# Patient Record
Sex: FEMALE | Race: WHITE | HISPANIC OR LATINO | Employment: UNEMPLOYED | URBAN - METROPOLITAN AREA
[De-identification: names, ages, dates, MRNs, and addresses within clinical notes are randomized per-mention and may not be internally consistent; named-entity substitution may affect disease eponyms.]

---

## 2021-09-30 ENCOUNTER — OFFICE VISIT (OUTPATIENT)
Dept: INTERNAL MEDICINE CLINIC | Facility: CLINIC | Age: 51
End: 2021-09-30

## 2021-09-30 VITALS
DIASTOLIC BLOOD PRESSURE: 71 MMHG | BODY MASS INDEX: 26.39 KG/M2 | TEMPERATURE: 98.3 F | SYSTOLIC BLOOD PRESSURE: 124 MMHG | HEIGHT: 60 IN | HEART RATE: 65 BPM | OXYGEN SATURATION: 99 % | WEIGHT: 134.4 LBS

## 2021-09-30 DIAGNOSIS — L71.9 ROSACEA: ICD-10-CM

## 2021-09-30 DIAGNOSIS — G56.03 CARPAL TUNNEL SYNDROME, BILATERAL UPPER LIMBS: ICD-10-CM

## 2021-09-30 DIAGNOSIS — M25.50 ARTHRALGIA, UNSPECIFIED JOINT: ICD-10-CM

## 2021-09-30 DIAGNOSIS — Z23 NEED FOR INFLUENZA VACCINATION: ICD-10-CM

## 2021-09-30 DIAGNOSIS — L65.9 HAIR THINNING: ICD-10-CM

## 2021-09-30 DIAGNOSIS — Z23 NEED FOR TDAP VACCINATION: ICD-10-CM

## 2021-09-30 DIAGNOSIS — N92.6 IRREGULAR MENSES: ICD-10-CM

## 2021-09-30 DIAGNOSIS — E55.9 VITAMIN D DEFICIENCY: ICD-10-CM

## 2021-09-30 DIAGNOSIS — Z12.4 SCREENING FOR CERVICAL CANCER: ICD-10-CM

## 2021-09-30 DIAGNOSIS — Z12.31 ENCOUNTER FOR SCREENING MAMMOGRAM FOR MALIGNANT NEOPLASM OF BREAST: ICD-10-CM

## 2021-09-30 DIAGNOSIS — Z86.39 HISTORY OF DIABETES MELLITUS, TYPE II: Primary | ICD-10-CM

## 2021-09-30 DIAGNOSIS — E66.3 OVERWEIGHT (BMI 25.0-29.9): ICD-10-CM

## 2021-09-30 PROCEDURE — 90472 IMMUNIZATION ADMIN EACH ADD: CPT | Performed by: PHYSICIAN ASSISTANT

## 2021-09-30 PROCEDURE — 90686 IIV4 VACC NO PRSV 0.5 ML IM: CPT | Performed by: PHYSICIAN ASSISTANT

## 2021-09-30 PROCEDURE — 90471 IMMUNIZATION ADMIN: CPT | Performed by: PHYSICIAN ASSISTANT

## 2021-09-30 PROCEDURE — 90715 TDAP VACCINE 7 YRS/> IM: CPT | Performed by: PHYSICIAN ASSISTANT

## 2021-09-30 PROCEDURE — 99204 OFFICE O/P NEW MOD 45 MIN: CPT | Performed by: PHYSICIAN ASSISTANT

## 2021-09-30 RX ORDER — METRONIDAZOLE 7.5 MG/G
GEL TOPICAL 2 TIMES DAILY
Qty: 45 G | Refills: 0 | Status: SHIPPED | OUTPATIENT
Start: 2021-09-30 | End: 2021-10-28 | Stop reason: SDUPTHER

## 2021-09-30 NOTE — PROGRESS NOTES
Assessment/Plan: We are starting your care today  We reviewed past medical history of diabetes with no recent testing or evaluation  Script provided to get all of your labs updated  We discussed your history of rosacea and your skin examination does show you do need medication  Prescription for Metrogel topical has been sent to your pharmacy  Please apply small amount twice a day  Please also to make sure that you use good sunscreen to protect her face as well  Based on her history you are experiencing carpal tunnel in both of her wrists with the pain numbness and tingling in the morning  Script has been provided to purchase the splints which you will wear at night while sleeping and will re-evaluate if any improvement in symptoms at follow-up  You also report history of generalized thinning of her hair as well as irregular menses although you report irregular menstruation has always been your pattern  Additional lab work has been ordered to further evaluate including CBC and thyroid  Labs were also ordered for screening for possible rheumatoid arthritis based on complaint of pain in specific joints in her fingers and family history of arthritis although you are not sure specifically what type of arthritis they have  Referral to gyn provided to establish care and discuss menstruation  Script provided today to schedule your mammogram     You will bring  COVID vaccine card at follow-up visit  Flu and tetanus vaccines updated today  No problem-specific Assessment & Plan notes found for this encounter  Diagnoses and all orders for this visit:    History of diabetes mellitus, type II  -     Comprehensive metabolic panel  -     Hemoglobin A1C  -     Lipid Panel with Direct LDL reflex  -     Microalbumin / creatinine urine ratio    Rosacea  -     metroNIDAZOLE (METROGEL) 0 75 % gel;  Apply topically 2 (two) times a day    Carpal tunnel syndrome, bilateral upper limbs  - Cock Up Wrist Splint    Hair thinning  -     CBC and differential  -     TSH, 3rd generation with Free T4 reflex    Irregular menses  -     CBC and differential  -     TSH, 3rd generation with Free T4 reflex    Arthralgia, unspecified joint  -     RF Screen w/ Reflex to Titer; Future  -     Cyclic citrul peptide antibody, IgG; Future    Overweight (BMI 25 0-29  9)    Vitamin D deficiency  -     Vitamin D 25 hydroxy; Future    Need for influenza vaccination  -     influenza vaccine, quadrivalent, 0 5 mL, preservative-free, for adult and pediatric patients 6 mos+ (AFLURIA, FLUARIX, FLULAVAL, FLUZONE)    Need for Tdap vaccination  -     TDAP VACCINE GREATER THAN OR EQUAL TO 8YO IM    Encounter for screening mammogram for malignant neoplasm of breast  -     Mammo screening bilateral w 3d & cad; Future    Screening for cervical cancer  -     Ambulatory referral to Obstetrics / Gynecology; Future          Subjective:      Patient ID: Grayson Russ is a 53 y o  female  Patient presents today as new patient to establish care  [facility]   Patient reports she moved here recently from [place]  July 17th    Patient reports in [place] she was told that she was diabetic with an A1c of 7% but does not have those records  Diagnosed 2020  Was on metformin was taking once a day then was decreased to 1/2 pill once a day    Patient reports that she was also diagnosed with rosacea  Patient is agreeable to referrals to gyn, labs and mammogram     Patient is agreeable to vaccines  She reports she did receive her COVID vaccine but does not have her card with her today     patient also complaining of numbness in her hands when she wakes up in the morning  Patient likely has carpal tunnel however care is going to be somewhat complicated in the fact that she does not have any insurance she will need to purchase the splints on her own     Will provide script so she may schedule the EMG for further evaluation and if appropriate will be referred to the orthopedic hand specialist if she has no improvement with splints and resting  As noted patient does report occasionally having pain in the joints of her fingers but denies any swelling or redness  No definitive known history of rheumatoid arthritis or autoimmune arthritis but states that her grandmother has "arthritis" but does not know what type  more likely to be osteoarthritis however given patient's age and ethnicity rheumatoid arthritis is also in the differential will be evaluated  No meds today  No OTC medications              The following portions of the patient's history were reviewed and updated as appropriate: allergies, current medications, past family history, past medical history, past social history, past surgical history and problem list     Review of Systems   Constitutional: Negative  Negative for diaphoresis, fever and unexpected weight change  HENT: Negative  Eyes:        Glasses   Respiratory: Negative  Negative for cough, chest tightness and shortness of breath  No asthma   Cardiovascular: Negative  Negative for chest pain, palpitations and leg swelling  Gastrointestinal: Negative  Negative for abdominal pain  Endocrine: Positive for cold intolerance  Negative for heat intolerance, polydipsia and polyphagia  Genitourinary: Negative for frequency and urgency  Menstruation irregular, states has always been irregular   Musculoskeletal: Positive for arthralgias and myalgias  Skin:        As notices hair getting thinner    Was diagnosed rosacia   Neurological: Positive for numbness (hands darci in am states happens about 4 X a week)  Negative for dizziness, seizures, light-headedness and headaches  Psychiatric/Behavioral: Negative            Objective:      /71 (BP Location: Left arm, Patient Position: Sitting, Cuff Size: Standard)   Pulse 65   Temp 98 3 °F (36 8 °C) (Temporal)   Ht 5' (1 524 m)   Wt 61 kg (134 lb 6 4 oz)   SpO2 99%   BMI 26 25 kg/m²          Physical Exam  Vitals and nursing note reviewed  Constitutional:       General: She is not in acute distress  Appearance: She is not ill-appearing  HENT:      Head: Normocephalic  Eyes:      Conjunctiva/sclera: Conjunctivae normal    Neck:      Vascular: No carotid bruit  Cardiovascular:      Rate and Rhythm: Normal rate and regular rhythm  Heart sounds: Normal heart sounds  Pulmonary:      Effort: Pulmonary effort is normal       Breath sounds: Normal breath sounds  Abdominal:      General: Bowel sounds are normal    Musculoskeletal:         General: No tenderness  Cervical back: Normal range of motion and neck supple  Right lower leg: No edema  Left lower leg: No edema  Comments: Patient does have some noted slight enlargement to PI pees on hands but reports never has redness or swelling will occasionally get joint pains primarily when it is more cold outside   Skin:     General: Skin is warm and dry  Comments: Patient does have erythematous base with dryness and small red papules across cheeks forehead consistent with rosacea  Does have generalized thinning hair   Neurological:      General: No focal deficit present  Mental Status: She is alert and oriented to person, place, and time  Sensory: No sensory deficit  Motor: No weakness  Comments:   Negative Tinel bilateral   positive Phalen right greater than left   Psychiatric:         Mood and Affect: Mood normal          Thought Content: Thought content normal          PHQ-9 Depression Screening    PHQ-9:   Frequency of the following problems over the past two weeks:      Little interest or pleasure in doing things: 0 - not at all  Feeling down, depressed, or hopeless: 0 - not at all  PHQ-2 Score: 0       BMI Counseling: Body mass index is 26 25 kg/m²   The BMI is above normal  Nutrition recommendations include reducing portion sizes, 3-5 servings of fruits/vegetables daily, consuming healthier snacks, moderation in carbohydrate intake, reducing intake of saturated fat and trans fat and reducing intake of cholesterol  Exercise recommendations include exercising 3-5 times per week

## 2021-09-30 NOTE — PATIENT INSTRUCTIONS
We are starting your care today  We reviewed past medical history of diabetes with no recent testing or evaluation  Script provided to get all of your labs updated  We discussed your history of rosacea and your skin examination does show you do need medication  Prescription for Metrogel topical has been sent to your pharmacy  Please apply small amount twice a day  Please also to make sure that you use good sunscreen to protect her face as well  Based on her history you are experiencing carpal tunnel in both of her wrists with the pain numbness and tingling in the morning  Script has been provided to purchase the splints which you will wear at night while sleeping and will re-evaluate if any improvement in symptoms at follow-up  You also report history of generalized thinning of her hair as well as irregular menses although you report irregular menstruation has always been your pattern  Additional lab work has been ordered to further evaluate including CBC and thyroid  Labs were also ordered for screening for possible rheumatoid arthritis based on complaint of pain in specific joints in her fingers and family history of arthritis although you are not sure specifically what type of arthritis they have  Referral to gyn provided to establish care and discuss menstruation  Script provided today to schedule your mammogram     You will bring  COVID vaccine card at follow-up visit  Flu and tetanus vaccines updated today

## 2021-10-01 ENCOUNTER — APPOINTMENT (OUTPATIENT)
Dept: LAB | Facility: MEDICAL CENTER | Age: 51
End: 2021-10-01

## 2021-10-01 DIAGNOSIS — E55.9 VITAMIN D DEFICIENCY: ICD-10-CM

## 2021-10-01 DIAGNOSIS — M25.50 ARTHRALGIA, UNSPECIFIED JOINT: ICD-10-CM

## 2021-10-01 LAB
25(OH)D3 SERPL-MCNC: 19.9 NG/ML (ref 30–100)
ALBUMIN SERPL BCP-MCNC: 3.6 G/DL (ref 3.5–5)
ALP SERPL-CCNC: 92 U/L (ref 46–116)
ALT SERPL W P-5'-P-CCNC: 23 U/L (ref 12–78)
ANION GAP SERPL CALCULATED.3IONS-SCNC: 4 MMOL/L (ref 4–13)
AST SERPL W P-5'-P-CCNC: 10 U/L (ref 5–45)
BASOPHILS # BLD AUTO: 0.02 THOUSANDS/ΜL (ref 0–0.1)
BASOPHILS NFR BLD AUTO: 0 % (ref 0–1)
BILIRUB SERPL-MCNC: 0.46 MG/DL (ref 0.2–1)
BUN SERPL-MCNC: 11 MG/DL (ref 5–25)
CALCIUM SERPL-MCNC: 9 MG/DL (ref 8.3–10.1)
CHLORIDE SERPL-SCNC: 108 MMOL/L (ref 100–108)
CHOLEST SERPL-MCNC: 202 MG/DL (ref 50–200)
CO2 SERPL-SCNC: 26 MMOL/L (ref 21–32)
CREAT SERPL-MCNC: 0.47 MG/DL (ref 0.6–1.3)
EOSINOPHIL # BLD AUTO: 0.04 THOUSAND/ΜL (ref 0–0.61)
EOSINOPHIL NFR BLD AUTO: 1 % (ref 0–6)
ERYTHROCYTE [DISTWIDTH] IN BLOOD BY AUTOMATED COUNT: 13 % (ref 11.6–15.1)
EST. AVERAGE GLUCOSE BLD GHB EST-MCNC: 157 MG/DL
GFR SERPL CREATININE-BSD FRML MDRD: 116 ML/MIN/1.73SQ M
GLUCOSE P FAST SERPL-MCNC: 140 MG/DL (ref 65–99)
HBA1C MFR BLD: 7.1 %
HCT VFR BLD AUTO: 40.7 % (ref 34.8–46.1)
HDLC SERPL-MCNC: 32 MG/DL
HGB BLD-MCNC: 13.4 G/DL (ref 11.5–15.4)
IMM GRANULOCYTES # BLD AUTO: 0.02 THOUSAND/UL (ref 0–0.2)
IMM GRANULOCYTES NFR BLD AUTO: 0 % (ref 0–2)
LDLC SERPL CALC-MCNC: 119 MG/DL (ref 0–100)
LYMPHOCYTES # BLD AUTO: 0.73 THOUSANDS/ΜL (ref 0.6–4.47)
LYMPHOCYTES NFR BLD AUTO: 14 % (ref 14–44)
MCH RBC QN AUTO: 31.5 PG (ref 26.8–34.3)
MCHC RBC AUTO-ENTMCNC: 32.9 G/DL (ref 31.4–37.4)
MCV RBC AUTO: 96 FL (ref 82–98)
MONOCYTES # BLD AUTO: 0.32 THOUSAND/ΜL (ref 0.17–1.22)
MONOCYTES NFR BLD AUTO: 6 % (ref 4–12)
NEUTROPHILS # BLD AUTO: 3.97 THOUSANDS/ΜL (ref 1.85–7.62)
NEUTS SEG NFR BLD AUTO: 79 % (ref 43–75)
NRBC BLD AUTO-RTO: 0 /100 WBCS
PLATELET # BLD AUTO: 288 THOUSANDS/UL (ref 149–390)
PMV BLD AUTO: 11.4 FL (ref 8.9–12.7)
POTASSIUM SERPL-SCNC: 3.7 MMOL/L (ref 3.5–5.3)
PROT SERPL-MCNC: 7.3 G/DL (ref 6.4–8.2)
RBC # BLD AUTO: 4.26 MILLION/UL (ref 3.81–5.12)
RHEUMATOID FACT SER QL LA: NEGATIVE
SODIUM SERPL-SCNC: 138 MMOL/L (ref 136–145)
TRIGL SERPL-MCNC: 256 MG/DL
TSH SERPL DL<=0.05 MIU/L-ACNC: 1.15 UIU/ML (ref 0.36–3.74)
WBC # BLD AUTO: 5.1 THOUSAND/UL (ref 4.31–10.16)

## 2021-10-01 PROCEDURE — 85025 COMPLETE CBC W/AUTO DIFF WBC: CPT | Performed by: PHYSICIAN ASSISTANT

## 2021-10-01 PROCEDURE — 83036 HEMOGLOBIN GLYCOSYLATED A1C: CPT | Performed by: PHYSICIAN ASSISTANT

## 2021-10-01 PROCEDURE — 86430 RHEUMATOID FACTOR TEST QUAL: CPT

## 2021-10-01 PROCEDURE — 36415 COLL VENOUS BLD VENIPUNCTURE: CPT | Performed by: PHYSICIAN ASSISTANT

## 2021-10-01 PROCEDURE — 80053 COMPREHEN METABOLIC PANEL: CPT | Performed by: PHYSICIAN ASSISTANT

## 2021-10-01 PROCEDURE — 80061 LIPID PANEL: CPT | Performed by: PHYSICIAN ASSISTANT

## 2021-10-01 PROCEDURE — 84443 ASSAY THYROID STIM HORMONE: CPT | Performed by: PHYSICIAN ASSISTANT

## 2021-10-01 PROCEDURE — 86200 CCP ANTIBODY: CPT

## 2021-10-01 PROCEDURE — 82306 VITAMIN D 25 HYDROXY: CPT

## 2021-10-05 LAB — CCP AB SER IA-ACNC: 1

## 2021-10-09 ENCOUNTER — APPOINTMENT (OUTPATIENT)
Dept: LAB | Facility: MEDICAL CENTER | Age: 51
End: 2021-10-09

## 2021-10-09 LAB
CREAT UR-MCNC: 88 MG/DL
MICROALBUMIN UR-MCNC: 9.5 MG/L (ref 0–20)
MICROALBUMIN/CREAT 24H UR: 11 MG/G CREATININE (ref 0–30)

## 2021-10-09 PROCEDURE — 82570 ASSAY OF URINE CREATININE: CPT | Performed by: PHYSICIAN ASSISTANT

## 2021-10-09 PROCEDURE — 82043 UR ALBUMIN QUANTITATIVE: CPT | Performed by: PHYSICIAN ASSISTANT

## 2021-10-28 ENCOUNTER — OFFICE VISIT (OUTPATIENT)
Dept: INTERNAL MEDICINE CLINIC | Facility: CLINIC | Age: 51
End: 2021-10-28

## 2021-10-28 VITALS
TEMPERATURE: 97.6 F | HEART RATE: 67 BPM | OXYGEN SATURATION: 97 % | WEIGHT: 132.2 LBS | BODY MASS INDEX: 25.95 KG/M2 | SYSTOLIC BLOOD PRESSURE: 131 MMHG | HEIGHT: 60 IN | DIASTOLIC BLOOD PRESSURE: 73 MMHG

## 2021-10-28 DIAGNOSIS — E66.3 OVERWEIGHT (BMI 25.0-29.9): ICD-10-CM

## 2021-10-28 DIAGNOSIS — E11.65 TYPE 2 DIABETES MELLITUS WITH HYPERGLYCEMIA, WITHOUT LONG-TERM CURRENT USE OF INSULIN (HCC): Primary | ICD-10-CM

## 2021-10-28 DIAGNOSIS — L71.9 ROSACEA: ICD-10-CM

## 2021-10-28 DIAGNOSIS — E55.9 VITAMIN D DEFICIENCY: ICD-10-CM

## 2021-10-28 DIAGNOSIS — G56.03 CARPAL TUNNEL SYNDROME, BILATERAL UPPER LIMBS: ICD-10-CM

## 2021-10-28 DIAGNOSIS — E78.2 MIXED HYPERLIPIDEMIA: ICD-10-CM

## 2021-10-28 PROBLEM — Z86.39 HISTORY OF DIABETES MELLITUS, TYPE II: Status: RESOLVED | Noted: 2021-09-30 | Resolved: 2021-10-28

## 2021-10-28 PROCEDURE — 99213 OFFICE O/P EST LOW 20 MIN: CPT | Performed by: PHYSICIAN ASSISTANT

## 2021-10-28 RX ORDER — METRONIDAZOLE 7.5 MG/G
GEL TOPICAL 2 TIMES DAILY
Qty: 45 G | Refills: 0 | Status: SHIPPED | OUTPATIENT
Start: 2021-10-28

## 2021-10-28 RX ORDER — METFORMIN HYDROCHLORIDE 500 MG/1
500 TABLET, EXTENDED RELEASE ORAL DAILY
Qty: 90 TABLET | Refills: 1 | Status: SHIPPED | OUTPATIENT
Start: 2021-10-28 | End: 2022-04-26

## 2021-10-28 RX ORDER — LOVASTATIN 20 MG/1
20 TABLET ORAL
Qty: 90 TABLET | Refills: 0 | Status: SHIPPED | OUTPATIENT
Start: 2021-10-28 | End: 2022-01-26

## 2021-11-03 ENCOUNTER — TELEMEDICINE (OUTPATIENT)
Dept: INTERNAL MEDICINE CLINIC | Facility: CLINIC | Age: 51
End: 2021-11-03

## 2021-11-03 DIAGNOSIS — U07.1 COVID-19: Primary | ICD-10-CM

## 2021-11-03 DIAGNOSIS — E11.65 TYPE 2 DIABETES MELLITUS WITH HYPERGLYCEMIA, WITHOUT LONG-TERM CURRENT USE OF INSULIN (HCC): ICD-10-CM

## 2021-11-03 PROCEDURE — 99213 OFFICE O/P EST LOW 20 MIN: CPT | Performed by: INTERNAL MEDICINE

## 2021-11-03 RX ORDER — SODIUM CHLORIDE 9 MG/ML
20 INJECTION, SOLUTION INTRAVENOUS ONCE
Status: CANCELLED | OUTPATIENT
Start: 2021-11-05

## 2021-11-03 RX ORDER — ONDANSETRON 2 MG/ML
4 INJECTION INTRAMUSCULAR; INTRAVENOUS ONCE AS NEEDED
Status: CANCELLED | OUTPATIENT
Start: 2021-11-05

## 2021-11-03 RX ORDER — ACETAMINOPHEN 325 MG/1
650 TABLET ORAL ONCE AS NEEDED
Status: CANCELLED | OUTPATIENT
Start: 2021-11-05

## 2021-11-03 RX ORDER — ALBUTEROL SULFATE 90 UG/1
3 AEROSOL, METERED RESPIRATORY (INHALATION) ONCE AS NEEDED
Status: CANCELLED | OUTPATIENT
Start: 2021-11-05

## 2021-11-04 ENCOUNTER — TELEPHONE (OUTPATIENT)
Dept: INTERNAL MEDICINE CLINIC | Facility: CLINIC | Age: 51
End: 2021-11-04

## 2021-11-05 ENCOUNTER — HOSPITAL ENCOUNTER (OUTPATIENT)
Dept: INFUSION CENTER | Facility: HOSPITAL | Age: 51
Discharge: HOME | End: 2021-11-05
Payer: COMMERCIAL

## 2021-11-05 VITALS
HEART RATE: 82 BPM | SYSTOLIC BLOOD PRESSURE: 97 MMHG | RESPIRATION RATE: 16 BRPM | DIASTOLIC BLOOD PRESSURE: 55 MMHG | OXYGEN SATURATION: 99 % | TEMPERATURE: 97.1 F

## 2021-11-05 DIAGNOSIS — U07.1 COVID-19: ICD-10-CM

## 2021-11-05 DIAGNOSIS — E11.65 TYPE 2 DIABETES MELLITUS WITH HYPERGLYCEMIA, WITHOUT LONG-TERM CURRENT USE OF INSULIN (HCC): Primary | ICD-10-CM

## 2021-11-05 PROCEDURE — M0243 CASIRIVI AND IMDEVI INFUSION: HCPCS | Performed by: INTERNAL MEDICINE

## 2021-11-05 RX ORDER — ONDANSETRON 2 MG/ML
4 INJECTION INTRAMUSCULAR; INTRAVENOUS ONCE AS NEEDED
Status: DISCONTINUED | OUTPATIENT
Start: 2021-11-05 | End: 2021-11-08 | Stop reason: HOSPADM

## 2021-11-05 RX ORDER — ACETAMINOPHEN 325 MG/1
650 TABLET ORAL ONCE AS NEEDED
Status: CANCELLED | OUTPATIENT
Start: 2021-11-05

## 2021-11-05 RX ORDER — ALBUTEROL SULFATE 90 UG/1
3 AEROSOL, METERED RESPIRATORY (INHALATION) ONCE AS NEEDED
Status: DISCONTINUED | OUTPATIENT
Start: 2021-11-05 | End: 2021-11-08 | Stop reason: HOSPADM

## 2021-11-05 RX ORDER — ALBUTEROL SULFATE 90 UG/1
3 AEROSOL, METERED RESPIRATORY (INHALATION) ONCE AS NEEDED
Status: CANCELLED | OUTPATIENT
Start: 2021-11-05

## 2021-11-05 RX ORDER — ACETAMINOPHEN 325 MG/1
650 TABLET ORAL ONCE AS NEEDED
Status: DISCONTINUED | OUTPATIENT
Start: 2021-11-05 | End: 2021-11-08 | Stop reason: HOSPADM

## 2021-11-05 RX ORDER — SODIUM CHLORIDE 9 MG/ML
20 INJECTION, SOLUTION INTRAVENOUS ONCE
Status: CANCELLED | OUTPATIENT
Start: 2021-11-05

## 2021-11-05 RX ORDER — ONDANSETRON 2 MG/ML
4 INJECTION INTRAMUSCULAR; INTRAVENOUS ONCE AS NEEDED
Status: CANCELLED | OUTPATIENT
Start: 2021-11-05

## 2021-11-05 RX ORDER — SODIUM CHLORIDE 9 MG/ML
20 INJECTION, SOLUTION INTRAVENOUS ONCE
Status: COMPLETED | OUTPATIENT
Start: 2021-11-05 | End: 2021-11-05

## 2021-11-05 RX ADMIN — SODIUM CHLORIDE 20 ML/HR: 9 INJECTION, SOLUTION INTRAVENOUS at 09:50

## 2021-11-05 RX ADMIN — CASIRIVIMAB AND IMDEVIMAB 1200 MG COMBINED: 600; 600 INJECTION, SOLUTION, CONCENTRATE INTRAVENOUS at 10:07

## 2021-11-08 ENCOUNTER — TELEMEDICINE (OUTPATIENT)
Dept: INTERNAL MEDICINE CLINIC | Facility: CLINIC | Age: 51
End: 2021-11-08

## 2021-11-08 DIAGNOSIS — U07.1 COVID-19: Primary | ICD-10-CM

## 2021-11-08 PROCEDURE — 99212 OFFICE O/P EST SF 10 MIN: CPT | Performed by: PHYSICIAN ASSISTANT

## 2021-12-08 ENCOUNTER — OFFICE VISIT (OUTPATIENT)
Dept: OBGYN CLINIC | Facility: CLINIC | Age: 51
End: 2021-12-08

## 2021-12-08 VITALS
HEART RATE: 69 BPM | BODY MASS INDEX: 25.13 KG/M2 | WEIGHT: 128 LBS | HEIGHT: 60 IN | SYSTOLIC BLOOD PRESSURE: 116 MMHG | DIASTOLIC BLOOD PRESSURE: 75 MMHG

## 2021-12-08 DIAGNOSIS — Z01.419 WELL WOMAN EXAM WITH ROUTINE GYNECOLOGICAL EXAM: Primary | ICD-10-CM

## 2021-12-08 DIAGNOSIS — Z12.4 SCREENING FOR CERVICAL CANCER: ICD-10-CM

## 2021-12-08 DIAGNOSIS — Z12.11 COLON CANCER SCREENING: ICD-10-CM

## 2021-12-08 PROCEDURE — G0476 HPV COMBO ASSAY CA SCREEN: HCPCS | Performed by: OBSTETRICS & GYNECOLOGY

## 2021-12-08 PROCEDURE — G0145 SCR C/V CYTO,THINLAYER,RESCR: HCPCS | Performed by: OBSTETRICS & GYNECOLOGY

## 2021-12-08 PROCEDURE — 99386 PREV VISIT NEW AGE 40-64: CPT | Performed by: OBSTETRICS & GYNECOLOGY

## 2021-12-09 LAB
HPV HR 12 DNA CVX QL NAA+PROBE: NEGATIVE
HPV16 DNA CVX QL NAA+PROBE: NEGATIVE
HPV18 DNA CVX QL NAA+PROBE: NEGATIVE

## 2021-12-14 ENCOUNTER — IMMUNIZATIONS (OUTPATIENT)
Dept: FAMILY MEDICINE CLINIC | Facility: HOSPITAL | Age: 51
End: 2021-12-14

## 2021-12-14 DIAGNOSIS — Z23 ENCOUNTER FOR IMMUNIZATION: Primary | ICD-10-CM

## 2021-12-14 LAB
LAB AP GYN PRIMARY INTERPRETATION: NORMAL
Lab: NORMAL

## 2021-12-14 PROCEDURE — 0064A COVID-19 MODERNA VACC 0.25 ML BOOSTER: CPT

## 2021-12-14 PROCEDURE — 91306 COVID-19 MODERNA VACC 0.25 ML BOOSTER: CPT

## 2023-03-03 ENCOUNTER — TELEPHONE (OUTPATIENT)
Dept: INTERNAL MEDICINE CLINIC | Facility: CLINIC | Age: 53
End: 2023-03-03

## 2023-03-03 NOTE — TELEPHONE ENCOUNTER
Not seen in a very long time  Can we see if she wants to make diabetes appt or if follows elsewhere?  Thanks,

## 2023-03-08 NOTE — TELEPHONE ENCOUNTER
Spoke to patient whom stated she moved to [place] and will no longer be establishing care here  Please remove us as PCP, thank you

## 2023-03-08 NOTE — TELEPHONE ENCOUNTER
If patient is not continuing care with us please send message to the [facility] team  If scheduling then please put diabetes follow up with A1C in appt note